# Patient Record
Sex: MALE | Race: OTHER | HISPANIC OR LATINO | URBAN - METROPOLITAN AREA
[De-identification: names, ages, dates, MRNs, and addresses within clinical notes are randomized per-mention and may not be internally consistent; named-entity substitution may affect disease eponyms.]

---

## 2023-03-08 ENCOUNTER — EMERGENCY (EMERGENCY)
Facility: HOSPITAL | Age: 38
LOS: 0 days | Discharge: HOME | End: 2023-03-08
Attending: STUDENT IN AN ORGANIZED HEALTH CARE EDUCATION/TRAINING PROGRAM
Payer: MEDICAID

## 2023-03-08 VITALS — DIASTOLIC BLOOD PRESSURE: 76 MMHG | HEART RATE: 82 BPM | SYSTOLIC BLOOD PRESSURE: 152 MMHG | OXYGEN SATURATION: 99 %

## 2023-03-08 VITALS
OXYGEN SATURATION: 99 % | HEART RATE: 93 BPM | HEIGHT: 61 IN | TEMPERATURE: 98 F | SYSTOLIC BLOOD PRESSURE: 195 MMHG | WEIGHT: 119.93 LBS | DIASTOLIC BLOOD PRESSURE: 95 MMHG | RESPIRATION RATE: 16 BRPM
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
## 2023-03-08 DIAGNOSIS — M79.645 PAIN IN LEFT FINGER(S): ICD-10-CM

## 2023-03-08 DIAGNOSIS — S69.82XA OTHER SPECIFIED INJURIES OF LEFT WRIST, HAND AND FINGER(S), INITIAL ENCOUNTER: ICD-10-CM
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
## 2023-03-08 PROCEDURE — 29125 APPL SHORT ARM SPLINT STATIC: CPT

## 2023-03-08 PROCEDURE — 29130 APPL FINGER SPLINT STATIC: CPT | Mod: FA

## 2023-03-08 PROCEDURE — 99284 EMERGENCY DEPT VISIT MOD MDM: CPT | Mod: 25

## 2023-03-08 PROCEDURE — 99283 EMERGENCY DEPT VISIT LOW MDM: CPT | Mod: 25

## 2023-03-08 PROCEDURE — 73130 X-RAY EXAM OF HAND: CPT | Mod: LT

## 2023-03-08 PROCEDURE — 73130 X-RAY EXAM OF HAND: CPT | Mod: 26,LT

## 2023-03-08 NOTE — ED PROVIDER NOTE - CLINICAL SUMMARY MEDICAL DECISION MAKING FREE TEXT BOX
Patient here for left thumb pain patient fell out of bed landed on his hand advised to come to the ED as fracture he may need further intervention.  Exam shows tenderness of the first digit.  Over the MCP with mild swelling no bruising no crepitus no nailbed injury no scaphoid tenderness limited range of motion due to pain.  Neurologically intact cardiovascular intact good cap refill.  X-rays reviewed patient will be splinted discharge

## 2023-03-08 NOTE — ED PROVIDER NOTE - PHYSICAL EXAMINATION
Physical Exam    Vital Signs: I have reviewed the initial vital signs.  Constitutional: well-nourished, appears stated age, no acute distress  Eyes: Conjunctiva pink, Sclera clear, PERRLA, EOMI.  Cardiovascular: S1 and S2, regular rate, regular rhythm, well-perfused extremities, radial pulses equal and 2+  Respiratory: unlabored respiratory effort, clear to auscultation bilaterally no wheezing, rales and rhonchi  Gastrointestinal: soft, non-tender abdomen, no pulsatile mass, normal bowl sounds  Musculoskeletal: supple neck, no lower extremity edema, no midline tenderness. TTP of the L 1st digit greates over the MCP, with mild swelling, no bruising, no crepitus. no nail bed injury. no scaphoid tenderness. Limited ROM 2/2 pain. sensory intact   Integumentary: warm, dry, no rash  Neurologic: awake, alert, cranial nerves II-XII grossly intact, extremities’ motor and sensory functions grossly intact  Psychiatric: appropriate mood, appropriate affect

## 2023-03-08 NOTE — ED PROVIDER NOTE - OBJECTIVE STATEMENT
Pt is a 38 male with PMH noted in chart presents to ED with complaints of L thumb pain for past few days. As per pt, fell out of bed, landing on his L hand/ thumb, went to , diagnosed with fracture, however advised to come into ED as fx may need further intervention? Pt denies any other injuries or complaints. pain is mild, non raditaing with no alleviating factors. no other complaints

## 2023-03-08 NOTE — ED PROVIDER NOTE - NSFOLLOWUPINSTRUCTIONS_ED_ALL_ED_FT
Follow up with your primary medical doctor in 1-2 days as well as with orthopedist    Many things can cause hand pain. Some common causes are:  An injury.  Repeating the same movement with your hand over and over (overuse).  Osteoporosis.  Arthritis.  Lumps in the tendons or joints of the hand and wrist (ganglion cysts).  Infection.  Follow these instructions at home:  Pay attention to any changes in your symptoms. Take these actions to help with your discomfort:  If directed, put ice on the affected area:  Put ice in a plastic bag.  Place a towel between your skin and the bag.  Leave the ice on for 15–20 minutes, 3?4 times a day for 2 days.  Take over-the-counter and prescription medicines only as told by your health care provider.  Minimize stress on your hands and wrists as much as possible.  Take breaks from repetitive activity often.  Do stretches as told by your health care provider.  Do not do activities that make your pain worse.  Contact a health care provider if:  Your pain does not get better after a few days of self-care.  Your pain gets worse.  Your pain affects your ability to do your daily activities.  Get help right away if:  Your hand becomes warm, red, or swollen.  Your hand is numb or tingling.  Your hand is extremely swollen or deformed.  Your hand or fingers turn white or blue.  You cannot move your hand, wrist, or fingers.  This information is not intended to replace advice given to you by your health care provider. Make sure you discuss any questions you have with your health care provider.

## 2023-03-08 NOTE — ED PROVIDER NOTE - CARE PROVIDER_API CALL
Venkatesh Cole)  Orthopaedic Surgery  [address] [place] [address]  Phone: [phone]  Fax: [phone]  Follow Up Time: 1-3 Days

## 2023-03-08 NOTE — ED PROVIDER NOTE - NS ED ATTENDING STATEMENT MOD
This was a shared visit with the MARIELOS. I reviewed and verified the documentation and independently performed the documented:

## 2023-03-08 NOTE — ED PROVIDER NOTE - PATIENT PORTAL LINK FT
You can access the FollowMyHealth Patient Portal offered by [facility] by registering at the following website: [email or web address]. By joining [facility]’s FollowMyHealth portal, you will also be able to view your health information using other applications (apps) compatible with our system.

## 2023-03-08 NOTE — ED ADULT TRIAGE NOTE - WEIGHT IN LBS
Skin Complaint HPI





- HPI Summary


HPI Summary: 





Patient patients with right great toe pain. He tells me that about 10 days ago 

he developed right great toe pain which progressed to swelling, redness, and 

mild purulent discharge. Today he is still having swelling, redness, and 

drainage from the lateral aspect of the toe. He does have an ingrown toenail on 

this same toe. He denies injury, numbness, tingling, fever, or chills.





- History of Current Complaint


Chief Complaint: UCLowerExtremity


Time Seen by Provider: 12/12/17 12:00


Stated Complaint: INGROWN TOENAIL


Hx Obtained From: Patient


Onset/Duration: Gradual Onset


Pain Intensity: 3


Pain Scale Used: 0-10 Numeric





- Allergy/Home Medications


Allergies/Adverse Reactions: 


 Allergies











Allergy/AdvReac Type Severity Reaction Status Date / Time


 


No Known Allergies Allergy   Verified 12/12/17 11:46














Review of Systems


Constitutional: Negative


Skin: Other - Pain, redness, swelling right great toe


Respiratory: Negative


Cardiovascular: Negative


Neurovascular: Negative


Musculoskeletal: Negative


Neurological: Negative


All Other Systems Reviewed And Are Negative: Yes





PMH/Surg Hx/FS Hx/Imm Hx


Previously Healthy: Yes





- Surgical History


Surgical History: Yes


Surgery Procedure, Year, and Place: right wrist





- Family History


Known Family History: Positive: Hypertension


   Negative: Cardiac Disease, Diabetes





- Social History


Occupation: Employed Full-time


Lives: With Family


Alcohol Use: None


Substance Use Type: None


Smoking Status (MU): Never Smoked Tobacco


Have You Smoked in the Last Year: No


Household Exposure Type: Cigarettes





- Immunization History


Most Recent Influenza Vaccination: NOT UTD


Most Recent Tetanus Shot: NOT UTD.





Physical Exam


Triage Information Reviewed: Yes


Appearance: Well-Appearing, Well-Nourished


Vital Signs: 


 Initial Vital Signs











Temp  97.7 F   12/12/17 11:39


 


Pulse  67   12/12/17 11:39


 


Resp  16   12/12/17 11:39


 


BP  122/71   12/12/17 11:39


 


Pulse Ox  100   12/12/17 11:39











Vital Signs Reviewed: Yes


Neck: Positive: Supple, Nontender, No Lymphadenopathy


Musculoskeletal: Positive: Strength Intact - Right great toe, ROM Intact - 

Right great toe, Edema @ - Right great toe - mild


Psychological: Positive: Age Appropriate Behavior


Skin: Positive: Other - Right great toe: there is mild erythema and edema on 

the medial aspect. There is mild purulent drainage able to be expressed, but 

with significant pain. No streaking or open wounds.





Course/Dx





- Course


Course Of Treatment: A time out was performed, witnessed, and signed. The area 

was cleansed with alcohol. 2mL of 2% lidocaine without epi was administered and 

good anesthetization was achieved. A #11 blade scalpel was used to make a <1mm 

incision at the base of the medial right great toe nail. The area was massaged 

and more purulent discharge was able to be expressed. Pt tolerated procedure 

well.  Will start him on Keflex for 7 days for paronychia and mild cellulitis





- Differential Diagnoses - Skin Complaint


Differential Diagnoses: Cellulitis





- Diagnoses


Provider Diagnoses: Paronychia right great toe





Discharge





- Discharge Plan


Condition: Stable


Disposition: HOME


Prescriptions: 


Cephalexin CAP* [Keflex CAP*] 500 mg PO BID #14 cap


Patient Education Materials:  Paronychia (ED)


Forms:  *Work Release


Referrals: 


Benny Kc MD [Primary Care Provider] - 


Additional Instructions: 


If you develop a fever, SOB, chest pain, new or worsening symptoms - please 

call your PCP or go to the ED. [phone]

## 2023-05-25 NOTE — ED ADULT TRIAGE NOTE - CHIEF COMPLAINT QUOTE
Assessment/Plan:     Chronic Problems:  Mild episode of recurrent major depressive disorder (HCC)  We will increase sertraline to 50 mg daily  Patient will return in 3 weeks  Advised to call with any issues with the medication  If no improvement in 3 weeks, we will switch medication  Gastroesophageal reflux disease without esophagitis  Much improved with omeprazole daily  Visit Diagnosis:  Diagnoses and all orders for this visit:    Mild episode of recurrent major depressive disorder ([facility] [address] )    Anxiety  -     sertraline (ZOLOFT) 50 mg tablet; Take 1 tablet (50 mg total) by mouth daily    Gastroesophageal reflux disease without esophagitis  -     omeprazole (PriLOSEC) 20 mg delayed release capsule; Take 1 capsule (20 mg total) by mouth daily          Subjective:    Patient ID: Greg Fernandez is a 24 y o  male  Patient presents for follow-up on anxiety and depression  Does not feel like the Zoloft is working yet  He feels his anxiety 7/10, depression is 6/10  He does feel that his omeprazole is working well  The following portions of the patient's history were reviewed and updated as appropriate: allergies, current medications, past family history, past medical history, past social history, past surgical history and problem list     Review of Systems   Constitutional: Negative for chills and fever  HENT: Negative for ear pain and sore throat  Eyes: Negative for pain and visual disturbance  Respiratory: Negative for cough and shortness of breath  Cardiovascular: Negative for chest pain and palpitations  Gastrointestinal: Negative for abdominal pain and vomiting  Genitourinary: Negative for dysuria and hematuria  Musculoskeletal: Negative for arthralgias and back pain  Skin: Negative for color change and rash  Neurological: Negative for dizziness, seizures, syncope, light-headedness and headaches  Psychiatric/Behavioral: Positive for dysphoric mood   Negative for "self-injury, sleep disturbance and suicidal ideas  The patient is nervous/anxious  All other systems reviewed and are negative  /80 (BP Location: Left arm, Patient Position: Sitting, Cuff Size: Standard)   Pulse 79   Temp (!) 96 1 °F (35 6 °C)   Ht 5' 6\" (1 676 m)   Wt 89 6 kg (197 lb 9 6 oz)   SpO2 97%   BMI 31 89 kg/m²   Social History     Socioeconomic History   • Marital status: Single     Spouse name: Not on file   • Number of children: Not on file   • Years of education: Not on file   • Highest education level: Not on file   Occupational History   • Not on file   Tobacco Use   • Smoking status: Never   • Smokeless tobacco: Never   Vaping Use   • Vaping Use: Never used   Substance and Sexual Activity   • Alcohol use: Yes     Comment: occasionally   • Drug use: Never   • Sexual activity: Yes   Other Topics Concern   • Not on file   Social History Narrative   • Not on file     Social Determinants of Health     Financial Resource Strain: Not on file   Food Insecurity: Not on file   Transportation Needs: Not on file   Physical Activity: Not on file   Stress: Not on file   Social Connections: Not on file   Intimate Partner Violence: Not on file   Housing Stability: Not on file     No past medical history on file  Family History   Problem Relation Age of Onset   • No Known Problems Mother    • No Known Problems Father      Past Surgical History:   Procedure Laterality Date   • FRACTURE SURGERY         Current Outpatient Medications:   •  omeprazole (PriLOSEC) 20 mg delayed release capsule, Take 1 capsule (20 mg total) by mouth daily, Disp: 90 capsule, Rfl: 1  •  sertraline (ZOLOFT) 50 mg tablet, Take 1 tablet (50 mg total) by mouth daily, Disp: 30 tablet, Rfl: 0    No Known Allergies       Lab Review   No visits with results within 2 Month(s) from this visit     Latest known visit with results is:   Appointment on 12/23/2021   Component Date Value   • Hep B S Ab 12/23/2021 126 61     " "    Imaging: No results found  Objective:     Physical Exam  Constitutional:       General: He is not in acute distress  Appearance: He is well-developed  Cardiovascular:      Rate and Rhythm: Normal rate and regular rhythm  Heart sounds: Normal heart sounds  No murmur heard  No gallop  Pulmonary:      Effort: Pulmonary effort is normal  No respiratory distress  Breath sounds: Normal breath sounds  No wheezing  Musculoskeletal:         General: Normal range of motion  Skin:     General: Skin is warm and dry  Neurological:      Mental Status: He is alert and oriented to person, place, and time  There are no Patient Instructions on file for this visit  NIKOS Perdue    Portions of the record may have been created with voice recognition software  Occasional wrong word or \"sound a like\" substitutions may have occurred due to the inherent limitations of voice recognition software  Read the chart carefully and recognize, using context, where substitutions have occurred    " Pt here with L thumb pain x few days after falling off bed.

## 2023-12-23 ENCOUNTER — EMERGENCY (EMERGENCY)
Facility: HOSPITAL | Age: 38
LOS: 0 days | Discharge: HOME | End: 2023-12-23
Attending: EMERGENCY MEDICINE
Payer: MEDICAID

## 2023-12-23 VITALS
HEART RATE: 89 BPM | SYSTOLIC BLOOD PRESSURE: 120 MMHG | OXYGEN SATURATION: 99 % | HEIGHT: 60 IN | TEMPERATURE: 98 F | RESPIRATION RATE: 18 BRPM | WEIGHT: 125 LBS | DIASTOLIC BLOOD PRESSURE: 71 MMHG

## 2023-12-23 DIAGNOSIS — M54.9 DORSALGIA, UNSPECIFIED: ICD-10-CM

## 2023-12-23 DIAGNOSIS — R07.81 PLEURODYNIA: ICD-10-CM

## 2023-12-23 DIAGNOSIS — R10.9 UNSPECIFIED ABDOMINAL PAIN: ICD-10-CM
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
## 2023-12-23 PROBLEM — Z78.9 OTHER SPECIFIED HEALTH STATUS: Chronic | Status: ACTIVE | Noted: 2023-03-08

## 2023-12-23 LAB
ALBUMIN SERPL ELPH-MCNC: 4.8 G/DL — SIGNIFICANT CHANGE UP (ref 3.5–5.2)
ALBUMIN SERPL ELPH-MCNC: 4.8 G/DL — SIGNIFICANT CHANGE UP (ref 3.5–5.2)
ALP SERPL-CCNC: 102 U/L — SIGNIFICANT CHANGE UP (ref 30–115)
ALP SERPL-CCNC: 102 U/L — SIGNIFICANT CHANGE UP (ref 30–115)
ALT FLD-CCNC: 147 U/L — HIGH (ref 0–41)
ALT FLD-CCNC: 147 U/L — HIGH (ref 0–41)
ANION GAP SERPL CALC-SCNC: 12 MMOL/L — SIGNIFICANT CHANGE UP (ref 7–14)
ANION GAP SERPL CALC-SCNC: 12 MMOL/L — SIGNIFICANT CHANGE UP (ref 7–14)
AST SERPL-CCNC: 190 U/L — HIGH (ref 0–41)
AST SERPL-CCNC: 190 U/L — HIGH (ref 0–41)
BASOPHILS # BLD AUTO: 0.05 K/UL — SIGNIFICANT CHANGE UP (ref 0–0.2)
BASOPHILS # BLD AUTO: 0.05 K/UL — SIGNIFICANT CHANGE UP (ref 0–0.2)
BASOPHILS NFR BLD AUTO: 1 % — SIGNIFICANT CHANGE UP (ref 0–1)
BASOPHILS NFR BLD AUTO: 1 % — SIGNIFICANT CHANGE UP (ref 0–1)
BILIRUB SERPL-MCNC: 0.4 MG/DL — SIGNIFICANT CHANGE UP (ref 0.2–1.2)
BILIRUB SERPL-MCNC: 0.4 MG/DL — SIGNIFICANT CHANGE UP (ref 0.2–1.2)
BUN SERPL-MCNC: 4 MG/DL — LOW (ref 10–20)
BUN SERPL-MCNC: 4 MG/DL — LOW (ref 10–20)
CALCIUM SERPL-MCNC: 9.1 MG/DL — SIGNIFICANT CHANGE UP (ref 8.4–10.4)
CALCIUM SERPL-MCNC: 9.1 MG/DL — SIGNIFICANT CHANGE UP (ref 8.4–10.4)
CHLORIDE SERPL-SCNC: 106 MMOL/L — SIGNIFICANT CHANGE UP (ref 98–110)
CHLORIDE SERPL-SCNC: 106 MMOL/L — SIGNIFICANT CHANGE UP (ref 98–110)
CO2 SERPL-SCNC: 25 MMOL/L — SIGNIFICANT CHANGE UP (ref 17–32)
CO2 SERPL-SCNC: 25 MMOL/L — SIGNIFICANT CHANGE UP (ref 17–32)
CREAT SERPL-MCNC: 0.8 MG/DL — SIGNIFICANT CHANGE UP (ref 0.7–1.5)
CREAT SERPL-MCNC: 0.8 MG/DL — SIGNIFICANT CHANGE UP (ref 0.7–1.5)
EGFR: 118 ML/MIN/1.73M2 — SIGNIFICANT CHANGE UP
EGFR: 118 ML/MIN/1.73M2 — SIGNIFICANT CHANGE UP
EOSINOPHIL # BLD AUTO: 0.1 K/UL — SIGNIFICANT CHANGE UP (ref 0–0.7)
EOSINOPHIL # BLD AUTO: 0.1 K/UL — SIGNIFICANT CHANGE UP (ref 0–0.7)
EOSINOPHIL NFR BLD AUTO: 2 % — SIGNIFICANT CHANGE UP (ref 0–8)
EOSINOPHIL NFR BLD AUTO: 2 % — SIGNIFICANT CHANGE UP (ref 0–8)
GLUCOSE SERPL-MCNC: 95 MG/DL — SIGNIFICANT CHANGE UP (ref 70–99)
GLUCOSE SERPL-MCNC: 95 MG/DL — SIGNIFICANT CHANGE UP (ref 70–99)
HCT VFR BLD CALC: 43.4 % — SIGNIFICANT CHANGE UP (ref 42–52)
HCT VFR BLD CALC: 43.4 % — SIGNIFICANT CHANGE UP (ref 42–52)
HGB BLD-MCNC: 15.1 G/DL — SIGNIFICANT CHANGE UP (ref 14–18)
HGB BLD-MCNC: 15.1 G/DL — SIGNIFICANT CHANGE UP (ref 14–18)
IMM GRANULOCYTES NFR BLD AUTO: 0.2 % — SIGNIFICANT CHANGE UP (ref 0.1–0.3)
IMM GRANULOCYTES NFR BLD AUTO: 0.2 % — SIGNIFICANT CHANGE UP (ref 0.1–0.3)
LYMPHOCYTES # BLD AUTO: 2.39 K/UL — SIGNIFICANT CHANGE UP (ref 1.2–3.4)
LYMPHOCYTES # BLD AUTO: 2.39 K/UL — SIGNIFICANT CHANGE UP (ref 1.2–3.4)
LYMPHOCYTES # BLD AUTO: 48.1 % — SIGNIFICANT CHANGE UP (ref 20.5–51.1)
LYMPHOCYTES # BLD AUTO: 48.1 % — SIGNIFICANT CHANGE UP (ref 20.5–51.1)
MCHC RBC-ENTMCNC: 32.1 PG — HIGH (ref 27–31)
MCHC RBC-ENTMCNC: 32.1 PG — HIGH (ref 27–31)
MCHC RBC-ENTMCNC: 34.8 G/DL — SIGNIFICANT CHANGE UP (ref 32–37)
MCHC RBC-ENTMCNC: 34.8 G/DL — SIGNIFICANT CHANGE UP (ref 32–37)
MCV RBC AUTO: 92.3 FL — SIGNIFICANT CHANGE UP (ref 80–94)
MCV RBC AUTO: 92.3 FL — SIGNIFICANT CHANGE UP (ref 80–94)
MONOCYTES # BLD AUTO: 0.6 K/UL — SIGNIFICANT CHANGE UP (ref 0.1–0.6)
MONOCYTES # BLD AUTO: 0.6 K/UL — SIGNIFICANT CHANGE UP (ref 0.1–0.6)
MONOCYTES NFR BLD AUTO: 12.1 % — HIGH (ref 1.7–9.3)
MONOCYTES NFR BLD AUTO: 12.1 % — HIGH (ref 1.7–9.3)
NEUTROPHILS # BLD AUTO: 1.82 K/UL — SIGNIFICANT CHANGE UP (ref 1.4–6.5)
NEUTROPHILS # BLD AUTO: 1.82 K/UL — SIGNIFICANT CHANGE UP (ref 1.4–6.5)
NEUTROPHILS NFR BLD AUTO: 36.6 % — LOW (ref 42.2–75.2)
NEUTROPHILS NFR BLD AUTO: 36.6 % — LOW (ref 42.2–75.2)
NRBC # BLD: 0 /100 WBCS — SIGNIFICANT CHANGE UP (ref 0–0)
NRBC # BLD: 0 /100 WBCS — SIGNIFICANT CHANGE UP (ref 0–0)
PLATELET # BLD AUTO: 236 K/UL — SIGNIFICANT CHANGE UP (ref 130–400)
PLATELET # BLD AUTO: 236 K/UL — SIGNIFICANT CHANGE UP (ref 130–400)
PMV BLD: 10.3 FL — SIGNIFICANT CHANGE UP (ref 7.4–10.4)
PMV BLD: 10.3 FL — SIGNIFICANT CHANGE UP (ref 7.4–10.4)
POTASSIUM SERPL-MCNC: 4.4 MMOL/L — SIGNIFICANT CHANGE UP (ref 3.5–5)
POTASSIUM SERPL-MCNC: 4.4 MMOL/L — SIGNIFICANT CHANGE UP (ref 3.5–5)
POTASSIUM SERPL-SCNC: 4.4 MMOL/L — SIGNIFICANT CHANGE UP (ref 3.5–5)
POTASSIUM SERPL-SCNC: 4.4 MMOL/L — SIGNIFICANT CHANGE UP (ref 3.5–5)
PROT SERPL-MCNC: 8.1 G/DL — HIGH (ref 6–8)
PROT SERPL-MCNC: 8.1 G/DL — HIGH (ref 6–8)
RBC # BLD: 4.7 M/UL — SIGNIFICANT CHANGE UP (ref 4.7–6.1)
RBC # BLD: 4.7 M/UL — SIGNIFICANT CHANGE UP (ref 4.7–6.1)
RBC # FLD: 11.9 % — SIGNIFICANT CHANGE UP (ref 11.5–14.5)
RBC # FLD: 11.9 % — SIGNIFICANT CHANGE UP (ref 11.5–14.5)
SODIUM SERPL-SCNC: 143 MMOL/L — SIGNIFICANT CHANGE UP (ref 135–146)
SODIUM SERPL-SCNC: 143 MMOL/L — SIGNIFICANT CHANGE UP (ref 135–146)
WBC # BLD: 4.97 K/UL — SIGNIFICANT CHANGE UP (ref 4.8–10.8)
WBC # BLD: 4.97 K/UL — SIGNIFICANT CHANGE UP (ref 4.8–10.8)
WBC # FLD AUTO: 4.97 K/UL — SIGNIFICANT CHANGE UP (ref 4.8–10.8)
WBC # FLD AUTO: 4.97 K/UL — SIGNIFICANT CHANGE UP (ref 4.8–10.8)

## 2023-12-23 PROCEDURE — 85025 COMPLETE CBC W/AUTO DIFF WBC: CPT

## 2023-12-23 PROCEDURE — 74177 CT ABD & PELVIS W/CONTRAST: CPT | Mod: MA

## 2023-12-23 PROCEDURE — 71045 X-RAY EXAM CHEST 1 VIEW: CPT

## 2023-12-23 PROCEDURE — 99285 EMERGENCY DEPT VISIT HI MDM: CPT

## 2023-12-23 PROCEDURE — 99284 EMERGENCY DEPT VISIT MOD MDM: CPT | Mod: 25

## 2023-12-23 PROCEDURE — 80053 COMPREHEN METABOLIC PANEL: CPT

## 2023-12-23 PROCEDURE — 72125 CT NECK SPINE W/O DYE: CPT | Mod: MA

## 2023-12-23 PROCEDURE — 72170 X-RAY EXAM OF PELVIS: CPT

## 2023-12-23 PROCEDURE — 36415 COLL VENOUS BLD VENIPUNCTURE: CPT

## 2023-12-23 PROCEDURE — 71045 X-RAY EXAM CHEST 1 VIEW: CPT | Mod: 26

## 2023-12-23 PROCEDURE — 70450 CT HEAD/BRAIN W/O DYE: CPT | Mod: MA

## 2023-12-23 PROCEDURE — 72170 X-RAY EXAM OF PELVIS: CPT | Mod: 26

## 2023-12-23 PROCEDURE — 71260 CT THORAX DX C+: CPT | Mod: 26,MA

## 2023-12-23 PROCEDURE — 72125 CT NECK SPINE W/O DYE: CPT | Mod: 26,MA

## 2023-12-23 PROCEDURE — 74177 CT ABD & PELVIS W/CONTRAST: CPT | Mod: 26,MA

## 2023-12-23 PROCEDURE — 71260 CT THORAX DX C+: CPT | Mod: MA

## 2023-12-23 PROCEDURE — 70450 CT HEAD/BRAIN W/O DYE: CPT | Mod: 26,MA

## 2023-12-23 RX ORDER — ACETAMINOPHEN 500 MG
650 TABLET ORAL ONCE
Refills: 0 | Status: COMPLETED | OUTPATIENT
Start: 2023-12-23 | End: 2023-12-23

## 2023-12-23 RX ADMIN — Medication 650 MILLIGRAM(S): at 14:37

## 2023-12-23 NOTE — ED PROVIDER NOTE - PHYSICAL EXAMINATION
Physical Exam    Vital Signs: I have reviewed the initial vital signs.  Constitutional: appears stated age, no acute distress  Eyes: Conjunctiva pink, Sclera clear,   Cardiovascular: S1 and S2, regular rate, regular rhythm, well-perfused extremities, radial pulses equal and 2+, pedal pulses 2+ and equal  Respiratory: unlabored respiratory effort, clear to auscultation bilaterally no wheezing, rales and rhonchi  Gastrointestinal: soft, non-tender abdomen, no pulsatile mass, normal bowl sounds  Musculoskeletal: supple neck, no lower extremity edema, no midline tenderness, ttp right ribs and flank area.   Integumentary: warm, dry, no rash  Neurologic: awake, alert, nvi

## 2023-12-23 NOTE — ED PROVIDER NOTE - CLINICAL SUMMARY MEDICAL DECISION MAKING FREE TEXT BOX
Patient presents after an 8 foot fall off of a ladder 3 days ago. Now with worse right rib and back pain. normal gait. labs, imaging done. no traumatic injuries. Results discussed. Made aware of LFTs. Discharged with pmd follow up and return precautions.

## 2023-12-23 NOTE — ED ADULT NURSE NOTE - OBJECTIVE STATEMENT
Patient presented to ED c/o pain to the right side of body after falling 8ft off of a ladder 3 days ago. Denies HT/LOC/Blood thinners

## 2023-12-23 NOTE — ED ADULT NURSE NOTE - NSFALLRISKINTERV_ED_ALL_ED
Communicate fall risk and risk factors to all staff, patient, and family/Provide visual cue: yellow wristband, yellow gown, etc/Reinforce activity limits and safety measures with patient and family/Call bell, personal items and telephone in reach/Instruct patient to call for assistance before getting out of bed/chair/stretcher/Non-slip footwear applied when patient is off stretcher/[place] to call system/Physically safe environment - no spills, clutter or unnecessary equipment/Purposeful Proactive Rounding/Room/bathroom lighting operational, light cord in reach Communicate fall risk and risk factors to all staff, patient, and family/Provide visual cue: yellow wristband, yellow gown, etc/Reinforce activity limits and safety measures with patient and family/Call bell, personal items and telephone in reach/Instruct patient to call for assistance before getting out of bed/chair/stretcher/Non-slip footwear applied when patient is off stretcher/[place] to call system/Physically safe environment - no spills, clutter or unnecessary equipment/Purposeful Proactive Rounding/Room/bathroom lighting operational, light cord in reach

## 2023-12-23 NOTE — ED PROVIDER NOTE - ATTENDING APP SHARED VISIT CONTRIBUTION OF CARE
38-year-old male no past medical history presents after fall.  Patient states that 3 days ago he was working on a ladder.  States that he lost his footing and fell.  Fall was approximately 8 foot and landed onto his right side.  No head trauma or LOC.  Patient was doing okay but now states that he is having worsening right-sided back and rib pain.  No nausea vomiting.  No numbness tingling or weakness.  No abdominal pain.  Walking normally.    CONSTITUTIONAL: Well-developed; well-nourished; in no acute distress.   SKIN: warm, dry  HEAD: Normocephalic; atraumatic.  EYES: PERRL, EOMI, no conjunctival erythema  ENT: No nasal discharge; airway clear.  NECK: Supple; non tender.  CARD: S1, S2 normal;  Regular rate and rhythm.   RESP: No wheezes, rales or rhonchi.  ABD: soft non tender, non distended, no rebound or guarding  EXT: Normal ROM.  5/5 strength in all 4 extremities. normal gait. no midline spinal tenderness. + right paraspinal tenderness. + right chest wall tenderness.   LYMPH: No acute cervical adenopathy.  NEURO: Alert, oriented, grossly unremarkable. neurovascularly intact  PSYCH: Cooperative, appropriate.

## 2023-12-23 NOTE — ED ADULT NURSE NOTE - NS ED NURSE LEVEL OF CONSCIOUSNESS MENTAL STATUS
Cardiomyopathy due to chemotherapy    Coronary artery disease    Diabetes    Dyslipidemia    HTN (hypertension)    Lymphoma
Awake/Alert

## 2023-12-23 NOTE — ED PROVIDER NOTE - OBJECTIVE STATEMENT
37 yo male, presents to er for fall, several days, ago fell off 8 ft ladder, c/o right rib and flank pain, mild, aching, no radiation. no chi, neck pain, cp, sob, abd pain, nv, loc. 39 yo male, presents to er for fall, several days, ago fell off 8 ft ladder, c/o right rib and flank pain, mild, aching, no radiation. no chi, neck pain, cp, sob, abd pain, nv, loc.

## 2023-12-23 NOTE — ED PROVIDER NOTE - PATIENT PORTAL LINK FT
You can access the FollowMyHealth Patient Portal offered by [facility] by registering at the following website: [email or web address]. By joining [employer]’s FollowMyHealth portal, you will also be able to view your health information using other applications (apps) compatible with our system. You can access the FollowMyHealth Patient Portal offered by [facility] by registering at the following website: [email or web address]. By joining [email or web address]’s FollowMyHealth portal, you will also be able to view your health information using other applications (apps) compatible with our system.

## 2024-09-02 ENCOUNTER — EMERGENCY (EMERGENCY)
Facility: HOSPITAL | Age: 39
LOS: 0 days | Discharge: HOME | End: 2024-09-02
Attending: STUDENT IN AN ORGANIZED HEALTH CARE EDUCATION/TRAINING PROGRAM
Payer: MEDICAID

## 2024-09-02 VITALS
RESPIRATION RATE: 18 BRPM | OXYGEN SATURATION: 97 % | DIASTOLIC BLOOD PRESSURE: 80 MMHG | WEIGHT: 119.93 LBS | HEART RATE: 88 BPM | SYSTOLIC BLOOD PRESSURE: 121 MMHG | TEMPERATURE: 98 F

## 2024-09-02 DIAGNOSIS — R10.13 EPIGASTRIC PAIN: ICD-10-CM

## 2024-09-02 DIAGNOSIS — Z90.49 ACQUIRED ABSENCE OF OTHER SPECIFIED PARTS OF DIGESTIVE TRACT: Chronic | ICD-10-CM

## 2024-09-02 DIAGNOSIS — R74.01 ELEVATION OF LEVELS OF LIVER TRANSAMINASE LEVELS: ICD-10-CM

## 2024-09-02 DIAGNOSIS — R07.89 OTHER CHEST PAIN: ICD-10-CM

## 2024-09-02 LAB
ALBUMIN SERPL ELPH-MCNC: 4.9 G/DL — SIGNIFICANT CHANGE UP (ref 3.5–5.2)
ALP SERPL-CCNC: 111 U/L — SIGNIFICANT CHANGE UP (ref 30–115)
ALT FLD-CCNC: 197 U/L — HIGH (ref 0–41)
ANION GAP SERPL CALC-SCNC: 14 MMOL/L — SIGNIFICANT CHANGE UP (ref 7–14)
AST SERPL-CCNC: 236 U/L — HIGH (ref 0–41)
BASOPHILS # BLD AUTO: 0.04 K/UL — SIGNIFICANT CHANGE UP (ref 0–0.2)
BASOPHILS NFR BLD AUTO: 0.6 % — SIGNIFICANT CHANGE UP (ref 0–1)
BILIRUB SERPL-MCNC: 0.3 MG/DL — SIGNIFICANT CHANGE UP (ref 0.2–1.2)
BUN SERPL-MCNC: 7 MG/DL — LOW (ref 10–20)
CALCIUM SERPL-MCNC: 9.1 MG/DL — SIGNIFICANT CHANGE UP (ref 8.4–10.5)
CHLORIDE SERPL-SCNC: 103 MMOL/L — SIGNIFICANT CHANGE UP (ref 98–110)
CO2 SERPL-SCNC: 24 MMOL/L — SIGNIFICANT CHANGE UP (ref 17–32)
CREAT SERPL-MCNC: 0.8 MG/DL — SIGNIFICANT CHANGE UP (ref 0.7–1.5)
EGFR: 117 ML/MIN/1.73M2 — SIGNIFICANT CHANGE UP
EOSINOPHIL # BLD AUTO: 0.17 K/UL — SIGNIFICANT CHANGE UP (ref 0–0.7)
EOSINOPHIL NFR BLD AUTO: 2.6 % — SIGNIFICANT CHANGE UP (ref 0–8)
GLUCOSE SERPL-MCNC: 101 MG/DL — HIGH (ref 70–99)
HCT VFR BLD CALC: 45.6 % — SIGNIFICANT CHANGE UP (ref 42–52)
HGB BLD-MCNC: 15.9 G/DL — SIGNIFICANT CHANGE UP (ref 14–18)
IMM GRANULOCYTES NFR BLD AUTO: 0.5 % — HIGH (ref 0.1–0.3)
LIDOCAIN IGE QN: 42 U/L — SIGNIFICANT CHANGE UP (ref 7–60)
LYMPHOCYTES # BLD AUTO: 2.57 K/UL — SIGNIFICANT CHANGE UP (ref 1.2–3.4)
LYMPHOCYTES # BLD AUTO: 38.6 % — SIGNIFICANT CHANGE UP (ref 20.5–51.1)
MAGNESIUM SERPL-MCNC: 2.5 MG/DL — HIGH (ref 1.8–2.4)
MCHC RBC-ENTMCNC: 32.5 PG — HIGH (ref 27–31)
MCHC RBC-ENTMCNC: 34.9 G/DL — SIGNIFICANT CHANGE UP (ref 32–37)
MCV RBC AUTO: 93.3 FL — SIGNIFICANT CHANGE UP (ref 80–94)
MONOCYTES # BLD AUTO: 0.72 K/UL — HIGH (ref 0.1–0.6)
MONOCYTES NFR BLD AUTO: 10.8 % — HIGH (ref 1.7–9.3)
NEUTROPHILS # BLD AUTO: 3.12 K/UL — SIGNIFICANT CHANGE UP (ref 1.4–6.5)
NEUTROPHILS NFR BLD AUTO: 46.9 % — SIGNIFICANT CHANGE UP (ref 42.2–75.2)
NRBC # BLD: 0 /100 WBCS — SIGNIFICANT CHANGE UP (ref 0–0)
NT-PROBNP SERPL-SCNC: <36 PG/ML — SIGNIFICANT CHANGE UP (ref 0–300)
PLATELET # BLD AUTO: 245 K/UL — SIGNIFICANT CHANGE UP (ref 130–400)
PMV BLD: 10.3 FL — SIGNIFICANT CHANGE UP (ref 7.4–10.4)
POTASSIUM SERPL-MCNC: 4.3 MMOL/L — SIGNIFICANT CHANGE UP (ref 3.5–5)
POTASSIUM SERPL-SCNC: 4.3 MMOL/L — SIGNIFICANT CHANGE UP (ref 3.5–5)
PROT SERPL-MCNC: 8 G/DL — SIGNIFICANT CHANGE UP (ref 6–8)
RBC # BLD: 4.89 M/UL — SIGNIFICANT CHANGE UP (ref 4.7–6.1)
RBC # FLD: 12.4 % — SIGNIFICANT CHANGE UP (ref 11.5–14.5)
SODIUM SERPL-SCNC: 141 MMOL/L — SIGNIFICANT CHANGE UP (ref 135–146)
TROPONIN T, HIGH SENSITIVITY RESULT: <6 NG/L — SIGNIFICANT CHANGE UP (ref 6–21)
WBC # BLD: 6.65 K/UL — SIGNIFICANT CHANGE UP (ref 4.8–10.8)
WBC # FLD AUTO: 6.65 K/UL — SIGNIFICANT CHANGE UP (ref 4.8–10.8)

## 2024-09-02 PROCEDURE — 71045 X-RAY EXAM CHEST 1 VIEW: CPT | Mod: 26

## 2024-09-02 PROCEDURE — 36415 COLL VENOUS BLD VENIPUNCTURE: CPT

## 2024-09-02 PROCEDURE — 76705 ECHO EXAM OF ABDOMEN: CPT

## 2024-09-02 PROCEDURE — 85025 COMPLETE CBC W/AUTO DIFF WBC: CPT

## 2024-09-02 PROCEDURE — 80053 COMPREHEN METABOLIC PANEL: CPT

## 2024-09-02 PROCEDURE — 84484 ASSAY OF TROPONIN QUANT: CPT

## 2024-09-02 PROCEDURE — 76705 ECHO EXAM OF ABDOMEN: CPT | Mod: 26

## 2024-09-02 PROCEDURE — 83690 ASSAY OF LIPASE: CPT

## 2024-09-02 PROCEDURE — 71045 X-RAY EXAM CHEST 1 VIEW: CPT

## 2024-09-02 PROCEDURE — 99285 EMERGENCY DEPT VISIT HI MDM: CPT

## 2024-09-02 PROCEDURE — 93005 ELECTROCARDIOGRAM TRACING: CPT

## 2024-09-02 PROCEDURE — 96374 THER/PROPH/DIAG INJ IV PUSH: CPT

## 2024-09-02 PROCEDURE — 93010 ELECTROCARDIOGRAM REPORT: CPT | Mod: 76

## 2024-09-02 PROCEDURE — 99285 EMERGENCY DEPT VISIT HI MDM: CPT | Mod: 25

## 2024-09-02 PROCEDURE — 83735 ASSAY OF MAGNESIUM: CPT

## 2024-09-02 PROCEDURE — 83880 ASSAY OF NATRIURETIC PEPTIDE: CPT

## 2024-09-02 RX ORDER — MAGNESIUM, ALUMINUM HYDROXIDE 200-225/5
30 SUSPENSION, ORAL (FINAL DOSE FORM) ORAL ONCE
Refills: 0 | Status: COMPLETED | OUTPATIENT
Start: 2024-09-02 | End: 2024-09-02

## 2024-09-02 RX ORDER — FAMOTIDINE 10 MG/ML
20 INJECTION INTRAVENOUS ONCE
Refills: 0 | Status: COMPLETED | OUTPATIENT
Start: 2024-09-02 | End: 2024-09-02

## 2024-09-02 RX ADMIN — Medication 30 MILLILITER(S): at 19:47

## 2024-09-02 RX ADMIN — FAMOTIDINE 20 MILLIGRAM(S): 10 INJECTION INTRAVENOUS at 19:47

## 2024-09-02 NOTE — ED PROVIDER NOTE - CARE PROVIDER_API CALL
Elier Guzman  Gastroenterology  [address] [place] [address]  Phone: [phone]  Fax: [phone]  Follow Up Time: 7-10 Days

## 2024-09-02 NOTE — ED PROVIDER NOTE - OBJECTIVE STATEMENT
39-year-old male with no significant past medical history presents to the ED for evaluation of midsternal chest pressure x 5 days.  Patient also reports she has been having epigastric pain and feels bloated.  Patient reports the pain is worse at night worse with eating.  Patient did eat out [employer] today.  Patient denies fever, chills, shortness of breath, back pain, neck pain, arm pain, jaw pain, recent trauma, nausea, vomiting, diarrhea, constipation, history of abdominal surgeries, family history of CAD, illicit drug use, cigarette smoking, or excessive use of alcohol.

## 2024-09-02 NOTE — ED PROVIDER NOTE - DIFFERENTIAL DIAGNOSIS
Differential Diagnosis pancreatitis, cholecystitis, choledocho, ACS, ptx, pneumomediastinum, pneumonia, pericarditis, myocarditis, pleural effusion. GI cocktail and reassess.

## 2024-09-02 NOTE — ED PROVIDER NOTE - ATTENDING APP SHARED VISIT CONTRIBUTION OF CARE
38 yo M with no PMHx who presents with nonradiating epigastric/midsternal cp x5 days. Sx worse at night and with eating. Nonexertional, nonpleuritic. No fever, sob, cough, nausea, vomiting, diarrhea, blood in urine/stool, leg swelling/pain, hx of clots, hormone use, recent immobilization/surg, malignancy, hemoptysis. Never seen cardiology or GI before. No FHx of CAD. Hx of appendectomy, no other abd surg.     No PMD    CONSTITUTIONAL: well developed, nontoxic appearing, in no acute distress, speaking in full sentences  SKIN: warm, dry, no rash, cap refill < 2 seconds  HEENT: normocephalic, atraumatic, no conjunctival erythema, moist mucous membranes, patent airway  NECK: supple  CV:  regular rate, regular rhythm, 2+ radial pulses bilaterally  RESP: no wheezes, no rales, no rhonchi, normal work of breathing  ABD: soft, mild epigastric tenderness, nondistended, no rebound, no guarding  MSK: normal ROM, no cyanosis, no edema, no calf tenderness  NEURO: alert, oriented, grossly unremarkable  PSYCH: cooperative, appropriate    A&P:  Pt here with postprandial/nocturnal epigastric/cp suggestive of PUD vs gastritis. Vitals wnl in ED. Low suspicion for PE given no PE risk factors, no tachycardia, no tachypnea, no hypoxemia, PERC negative. Low suspicion for dissection given equal distal pulses, normotensive, no neuro deficits however will check for widened mediastinum on cxr. Low suspicion for esophageal perforation given no recent instrumentation or vomiting, no increased thoracic pressure, no hammans crunch. Plan for labs, ekg, imaging r/o pancreatitis, cholecystitis, choledocho, ACS, ptx, pneumomediastinum, pneumonia, pericarditis, myocarditis, pleural effusion. GI cocktail and reassess.

## 2024-09-02 NOTE — ED PROVIDER NOTE - PHYSICAL EXAMINATION
Physical Exam    Vital Signs: I have reviewed the initial vital signs.  Constitutional: well-nourished, appears stated age, no acute distress  Eyes: Conjunctiva pink, Sclera clear  Cardiovascular: regular rate, regular rhythm, well-perfused extremities, radial pulses equal and 2+ b/l.   Respiratory: unlabored respiratory effort, clear to auscultation bilaterally no wheezing, rales and rhonchi. pt is speaking full sentences. no accessory muscle use. (+) reproducible midsternal chest tenderness. no flail chest or chest wall crepitus.  Gastrointestinal: soft, non-tender, nondistended abdomen, no pulsatile mass, no rebound, no guarding, negative murphys.   Musculoskeletal: FROM of b/l upper and lower extremities  Integumentary: warm, dry, no rash  Neurologic: awake, alert,  steady gait.   Psychiatric: appropriate mood, appropriate affect

## 2024-09-02 NOTE — ED PROVIDER NOTE - NSFOLLOWUPINSTRUCTIONS_ED_ALL_ED_FT
Gastritis en adultos    La gastritis es la inflamación del estómago. Hay dos tipos de gastritis:  - Gastritis aguda. Christina tipo aparece de manera repentina.  - Gastritis crónica. Christina tipo es mucho más frecuente. Se desarrolla lentamente y dura un mariano tiempo.    La gastritis se manifiesta cuando el revestimiento del estómago se debilita o se lesiona. Sin tratamiento, la gastritis puede causar sangrado y úlceras estomacales.    ¿Cuáles son las causas?  Esta afección puede ser causada por lo siguiente:  - Yuridia infección.  - Beber alcohol en exceso.  - Ciertos medicamentos. Estos incluyen corticoesteroides, antibióticos y algunos medicamentos de venta sin receta, case aspirina o ibuprofeno.  - Tener demasiado ácido en el estómago.  - Tener yuridia enfermedad del estómago.  Algunas otras causas son las siguientes:  - Yuridia reacción alérgica.  - Algunos tratamientos para el cáncer (radiación).  - Fumar cigarrillos o usar productos que contengan nicotina o tabaco.  - En algunos casos, se desconoce la causa de esta afección.    ¿Qué incrementa el riesgo?  Tener yuridia enfermedad de los intestinos.  Tener yuridia enfermedad por la cual el propio sistema inmunitario ataca el organismo (enfermedad autoinmunitaria), case la enfermedad de Crohn.  Usar aspirina o ibuprofeno y otros antiinflamatorios no esteroideos (BELTRAN) para tratar otras afecciones, case enfermedades cardíacas o dolor crónico.  Estrés.    ¿Cuáles son los signos o síntomas?  Los síntomas de esta afección incluyen los siguientes:  - Dolor o sensación de ardor en la parte superior del abdomen.  - Náuseas.  - Vómitos.  - Sensación molesta de saciedad después de comer.  - Pérdida de peso.  - Mal aliento.  - Rose en el vómito o en la materia fecal (heces).  En algunos casos, no hay síntomas.    ¿Cómo se diagnostica?  Esta afección se puede diagnosticar en función de nilda antecedentes médicos, un examen físico y pruebas. Las pruebas pueden incluir las siguientes:  - Nilda antecedentes médicos y yuridia descripción de nilda síntomas.  - Un examen físico.  - Pruebas. Estas pueden incluir las siguientes:  - Pruebas de rose.  - Pruebas de heces.  Yuridia prueba en la que se introduce un instrumento zapata y flexible con yuridia hilaria y yuridia pequeña cámara a través del esófago hasta el estómago (endoscopía johnny).  Yuridia prueba en la que se radha yuridia muestra de tejido para analizarla con un microscopio (biopsia).    ¿Cómo se trata?  Esta afección se puede tratar con medicamentos. Los medicamentos que se utilizan varían según la causa de la gastritis.  Si la afección se debe a yuridia infección bacteriana, pueden recetarle medicamentos antibióticos.  Si la afección se debe al exceso de ácido estomacal, se pueden administrar medicamentos denominados bloqueadores H2, inhibidores de la bomba de protones o antiácidos.  El tratamiento también puede incluir interrumpir el uso de ciertos medicamentos case aspirina o ibuprofeno y otros antiinflamatorios no esteroideos (BELTRAN).    Siga estas instrucciones en lackey casa:    Medicamentos    Use los medicamentos de venta haleigh y los recetados solamente case se lo haya indicado el médico.  Si le recetaron un antibiótico, tómelo case se lo haya indicado el médico. No deje de claudio el antibiótico aunque comience a sentirse mejor.    Consumo de alcohol    No tamika alcohol si:  - Lackey médico le indica no hacerlo.  - Está embarazada, puede estar embarazada o está tratando de quedar embarazada.    Si andrea alcohol:  - Limite lackey uso a las siguientes medidas:  - De 0 a 1 medida por día para las mujeres.  - De 0 a 2 medidas por día para los hombres.  Sepa cuánta cantidad de alcohol hay en las bebidas que radha. [place], yuridia medida equivale a yuridia botella de cerveza de 12 oz (355 ml), un vaso de vino de 5 oz (148 ml) o un vaso de yuridia bebida alcohólica de johnny graduación de 1½ oz (44 ml).    Instrucciones generales    Luz comidas pequeñas y frecuentes merary el día en lugar de comidas abundantes.  Evite los alimentos y las bebidas que intensifican los síntomas.  Hable con el médico sobre las formas de controlar el estrés, case realizar ejercicio con regularidad o practicar respiración profunda, meditación o yoga.  No consuma ningún producto que contenga nicotina o tabaco. Estos productos incluyen cigarrillos, tabaco para mascar y aparatos de vapeo, case los cigarrillos electrónicos. Si necesita ayuda para dejar de fumar, consulte al médico.  Tamika suficiente líquido case para mantener la orina de color amarillo pálido.  Concurra a todas las visitas de seguimiento. [place] es importante.    Comuníquese con un médico si:  - Nilda síntomas empeoran.  - El dolor abdominal empeora.  - Los síntomas regresan después del tratamiento.  - Tiene fiebre.    Solicite ayuda de inmediato si:  - Vomita rose o yuridia sustancia parecida a los posos del café.  - Las heces son negras o de color garvin oscuro.  - No puede retener los líquidos.    Estos síntomas pueden representar un problema grave que constituye yuridia emergencia. No espere a mitra si los síntomas desaparecen. Solicite atención médica de inmediato. Comuníquese con el servicio de emergencias de lackey localidad (911 en los [place]). No conduzca por nilda propios medios hasta el hospital.    Resumen    La gastritis es la inflamación del revestimiento del estómago que puede ocurrir de manera repentina (aguda) o desarrollarse lentamente con el tiempo (crónica).  Esta afección se diagnostica mediante los antecedentes médicos, un examen físico o pruebas.  Esta afección puede tratarse con medicamentos para tratar la infección o medicamentos para reducir la cantidad de ácido en el estómago.  Siga las instrucciones del médico acerca de claudio medicamentos, hacer cambios en la dieta y saber cuándo pedir ayuda.    Esta información no tiene case fin reemplazar el consejo del médico. Asegúrese de hacerle al médico cualquier pregunta que tenga.    Dolor de pecho    LO QUE NECESITA SABER:    ¿Qué provoca el dolor en el pecho?El dolor en el pecho puede ser provocado por yuridia variedad de condiciones, algunas no tan serias y otras que son de peligro mortal. El dolor de pecho también puede ser un síntoma de un problema digestivo, case la acidez o yuridia úlcera estomacal. Un ataque de ansiedad o yuridia emoción jez, case el enojo, también pueden provocar dolor de pecho. Yuridia infección, inflamación o fractura en un hueso o cartílago en el pecho podría provocar dolor o molestia. En ocasiones el dolor torácico o la presión en el pecho pueden ser el resultado de radha circulación de la rose al corazón (angina). El dolor de pecho también puede ser causado por trastornos potencialmente mortales case un ataque al corazón o un coágulo de rose en los pulmones.    ¿Cuáles otros síntomas podrían presentarse con el dolor en el pecho?    - Yuridia sensación de ardor detrás del esternón  - Ritmo cardíaco acelerado o lento  - Fiebre o sudoración  - Náuseas o vómitos  - Falta de aliento  - Molestia o presión que se propaga del pecho a la espalda, mandíbula o brazo  - Sensación de debilidad, cansancio o desmayo    ¿Cómo se diagnostica la causa del dolor en el pecho? Lackey médico lo examinará. Describa lackey dolor en el pecho con el bob detalle que sea posible. Dígale dónde le duele y cuándo empezó el dolor. Coméntele si usted nota algo que hace empeorar o mejorar el dolor. Además infórmele si el dolor es irineo o intermitente. Lackey médico le preguntará cuáles son los medicamentos que radha y acerca de cualquier condición médica que tenga. También lo examinará. Es posible que también deba hacerse alguno de los siguientes exámenes:     - Un electrocardiogramaes un examen que registra la actividad eléctrica de lackey corazón.  - Los análisis de sangrerevisan si hay daños en el corazón y signos de ataque cardíaco.  - Un ecocardiogramausa ondas de kim para mitra si la rose fluye normalmente a través del corazón.  - Un ultrasonido, yuridia radiografía, yuridia tomografía computarizada o yuridia imagen por resonancia magnética (IRM)podría mostrar la causa de lackey dolor de pecho. Es posible que le administren líquido de contraste para que lackey corazón se lisa mejor en las imágenes. Dígale al médico si usted alguna vez ha tenido yuridia reacción alérgica al líquido de contraste. No entre a la pamela donde se realiza la resonancia magnética con algo de metal. El metal puede causar lesiones serias. Dígale al médico si usted tiene algo de metal dentro de lackey cuerpo o por encima.  - Yuridia endoscopiapuede hacerse para revisar si tiene úlceras o problemas con el esófago.    ¿Cómo se trata el dolor en el pecho?Se le podrán administrar medicamentos para tratar la causa del dolor de pecho. Por ejemplo, analgésicos, medicamentos para la ansiedad o medicamentos para aumentar el flujo de rose al corazón. No tome ciertos medicamentos sin antes preguntarle a lackey médico. Estos incluyen BELTRAN, suplementos vitamínicos o a base de hierbas u hormonas (estrógeno o progestágeno). Mehul o más stents podrían colocarse en lackey corazón, de ser necesarios, si el dolor era causado por la obstrucción. Un stent es un pequeño tubo elaborado en daria de alambre que ayuda a mantener abierta la arteria.    ¿Cuáles son algunos consejos para vivir yuridia mazin misty?Los siguientes son consejos generales de lencho. Si lackey dolor de pecho es causado por un problema cardíaco, lackey médico le dará consejos específicos a seguir.     - No fume.La nicotina y otros químicos contenidos en los cigarrillos y cigarros pueden causar daño a nilda pulmones y el corazón. Pida información a lackey médico si usted actualmente fuma y necesita ayuda para dejar de fumar. Los cigarrillos electrónicos o el tabaco sin humo igualmente contienen nicotina. Consulte con lackey médico antes de utilizar estos productos.  - Consuma yuridia variedad de alimentos saludables y bajos en grasas y dhruv.Los alimentos saludables incluyen frutas, verduras, pan integral, productos lácteos bajos en grasa, frijoles, dakotah magras y pescado. Pida más información acerca de yuridia dieta saludable para el corazón.  - Consuma abundante agua al día.Lackey cuerpo está conformado en lackey mayoría por agua. El agua ayuda al cuerpo a controlar la temperatura y la presión arterial. Pregunte a lackey médico cuál es la cantidad de agua que debería consumir cada día.  - Pregunte acerca de la actividad.Lackey médico le dirá cuáles actividades limitar y cuáles evitar. Pregunte cuándo puede manejar, regresar a lackey trabajo y tener relaciones sexuales. Pida más información acerca de un plan de ejercicio adecuado para usted.  - Mantenga un peso saludable.Consulte con lackey médico cuánto debería pesar. [place] que lo ayude a crear un plan para bajar de peso si tiene sobrepeso.    Llame al 911 si:    - Tiene alguno de los siguientes signos de un ataque cardíaco:   - Estrujamiento, presión o tensión en lackey pecho    Usted también podría presentar alguno de los siguientes:     - Malestar o dolor en lackey espalda, hector, mandíbula, abdomen, o brazo  - Falta de aliento  - Náuseas o vómitos  - Desvanecimiento o sudor frío repentino    ¿Cuándo valentine buscar atención inmediata?    - La inflamación en lackey pecho empeora, aun con tratamiento.  - Usted tose o vomita rose.  - Nilda heces son negras o tienen rose.  - Usted no puede dejar de vomitar o le duele al tragar.    ¿Cuándo valentine comunicarme con mi médico?    - Usted tiene preguntas o inquietudes acerca de lackey condición o cuidado. You were noted to have elevated liver enzymes. Please follow up with the gastroenterologist for further evaluation.  ----  Nuestros coordinadores de referencias del departamento de emergencias se comunicarán con usted en las próximas 24 a 48 horas de 9:00 a.m. a 5:00 p.m. (de lunes a viernes) con yuridia terry de seguimiento. Espere yuridia llamada telefónica del hospital en barbi período de tiempo. Si no recibe yuridia llamada o si tiene alguna pregunta o inquietud, puede comunicarse con ellos al [phone] o [phone].  ---  Gastritis en adultos    La gastritis es la inflamación del estómago. Hay dos tipos de gastritis:  - Gastritis aguda. Christina tipo aparece de manera repentina.  - Gastritis crónica. Christina tipo es mucho más frecuente. Se desarrolla lentamente y dura un mariano tiempo.    La gastritis se manifiesta cuando el revestimiento del estómago se debilita o se lesiona. Sin tratamiento, la gastritis puede causar sangrado y úlceras estomacales.    ¿Cuáles son las causas?  Esta afección puede ser causada por lo siguiente:  - Yuridia infección.  - Beber alcohol en exceso.  - Ciertos medicamentos. Estos incluyen corticoesteroides, antibióticos y algunos medicamentos de venta sin receta, case aspirina o ibuprofeno.  - Tener demasiado ácido en el estómago.  - Tener yuridia enfermedad del estómago.  Algunas otras causas son las siguientes:  - Yuridia reacción alérgica.  - Algunos tratamientos para el cáncer (radiación).  - Fumar cigarrillos o usar productos que contengan nicotina o tabaco.  - En algunos casos, se desconoce la causa de esta afección.    ¿Qué incrementa el riesgo?  Tener yuridia enfermedad de los intestinos.  Tener yuridia enfermedad por la cual el propio sistema inmunitario ataca el organismo (enfermedad autoinmunitaria), case la enfermedad de Crohn.  Usar aspirina o ibuprofeno y otros antiinflamatorios no esteroideos (BELTRAN) para tratar otras afecciones, case enfermedades cardíacas o dolor crónico.  Estrés.    ¿Cuáles son los signos o síntomas?  Los síntomas de esta afección incluyen los siguientes:  - Dolor o sensación de ardor en la parte superior del abdomen.  - Náuseas.  - Vómitos.  - Sensación molesta de saciedad después de comer.  - Pérdida de peso.  - Mal aliento.  - Rose en el vómito o en la materia fecal (heces).  En algunos casos, no hay síntomas.    ¿Cómo se diagnostica?  Esta afección se puede diagnosticar en función de nilda antecedentes médicos, un examen físico y pruebas. Las pruebas pueden incluir las siguientes:  - Nilda antecedentes médicos y yuridia descripción de nilda síntomas.  - Un examen físico.  - Pruebas. Estas pueden incluir las siguientes:  - Pruebas de rose.  - Pruebas de heces.  Yuridia prueba en la que se introduce un instrumento zapata y flexible con uyridia hilaria y yuridia pequeña cámara a través del esófago hasta el estómago (endoscopía johnny).  Yuridia prueba en la que se radha yuridia muestra de tejido para analizarla con un microscopio (biopsia).    ¿Cómo se trata?  Esta afección se puede tratar con medicamentos. Los medicamentos que se utilizan varían según la causa de la gastritis.  Si la afección se debe a yuridia infección bacteriana, pueden recetarle medicamentos antibióticos.  Si la afección se debe al exceso de ácido estomacal, se pueden administrar medicamentos denominados bloqueadores H2, inhibidores de la bomba de protones o antiácidos.  El tratamiento también puede incluir interrumpir el uso de ciertos medicamentos case aspirina o ibuprofeno y otros antiinflamatorios no esteroideos (BELTRAN).    Siga estas instrucciones en lackey casa:    Medicamentos    Use los medicamentos de venta haleigh y los recetados solamente case se lo haya indicado el médico.  Si le recetaron un antibiótico, tómelo case se lo haya indicado el médico. No deje de claudio el antibiótico aunque comience a sentirse mejor.    Consumo de alcohol    No tamika alcohol si:  - Lackey médico le indica no hacerlo.  - Está embarazada, puede estar embarazada o está tratando de quedar embarazada.    Si andrea alcohol:  - Limite lackey uso a las siguientes medidas:  - De 0 a 1 medida por día para las mujeres.  - De 0 a 2 medidas por día para los hombres.  Sepa cuánta cantidad de alcohol hay en las bebidas que radha. [place], yuridia medida equivale a yuridia botella de cerveza de 12 oz (355 ml), un vaso de vino de 5 oz (148 ml) o un vaso de yuridia bebida alcohólica de johnny graduación de 1½ oz (44 ml).    Instrucciones generales    Luz comidas pequeñas y frecuentes merary el día en lugar de comidas abundantes.  Evite los alimentos y las bebidas que intensifican los síntomas.  Hable con el médico sobre las formas de controlar el estrés, case realizar ejercicio con regularidad o practicar respiración profunda, meditación o yoga.  No consuma ningún producto que contenga nicotina o tabaco. Estos productos incluyen cigarrillos, tabaco para mascar y aparatos de vapeo, case los cigarrillos electrónicos. Si necesita ayuda para dejar de fumar, consulte al médico.  Tamika suficiente líquido case para mantener la orina de color amarillo pálido.  Concurra a todas las visitas de seguimiento. [place] es importante.    Comuníquese con un médico si:  - Nilda síntomas empeoran.  - El dolor abdominal empeora.  - Los síntomas regresan después del tratamiento.  - Tiene fiebre.    Solicite ayuda de inmediato si:  - Vomita rose o yuridia sustancia parecida a los posos del café.  - Las heces son negras o de color garvin oscuro.  - No puede retener los líquidos.    Estos síntomas pueden representar un problema grave que constituye yuridia emergencia. No espere a mitra si los síntomas desaparecen. Solicite atención médica de inmediato. Comuníquese con el servicio de emergencias de lackey localidad (911 en [place]). No conduzca por nilda propios medios hasta el hospital.    Resumen    La gastritis es la inflamación del revestimiento del estómago que puede ocurrir de manera repentina (aguda) o desarrollarse lentamente con el tiempo (crónica).  Esta afección se diagnostica mediante los antecedentes médicos, un examen físico o pruebas.  Esta afección puede tratarse con medicamentos para tratar la infección o medicamentos para reducir la cantidad de ácido en el estómago.  Siga las instrucciones del médico acerca de claudio medicamentos, hacer cambios en la dieta y saber cuándo pedir ayuda.    Esta información no tiene case fin reemplazar el consejo del médico. Asegúrese de hacerle al médico cualquier pregunta que tenga.    Dolor de pecho    LO QUE NECESITA SABER:    ¿Qué provoca el dolor en el pecho?El dolor en el pecho puede ser provocado por yuridia variedad de condiciones, algunas no tan serias y otras que son de peligro mortal. El dolor de pecho también puede ser un síntoma de un problema digestivo, case la acidez o yuridia úlcera estomacal. Un ataque de ansiedad o yuridia emoción jez, case el enojo, también pueden provocar dolor de pecho. Yuridia infección, inflamación o fractura en un hueso o cartílago en el pecho podría provocar dolor o molestia. En ocasiones el dolor torácico o la presión en el pecho pueden ser el resultado de radha circulación de la rose al corazón (angina). El dolor de pecho también puede ser causado por trastornos potencialmente mortales case un ataque al corazón o un coágulo de rose en los pulmones.    ¿Cuáles otros síntomas podrían presentarse con el dolor en el pecho?    - Yuridia sensación de ardor detrás del esternón  - Ritmo cardíaco acelerado o lento  - Fiebre o sudoración  - Náuseas o vómitos  - Falta de aliento  - Molestia o presión que se propaga del pecho a la espalda, mandíbula o brazo  - Sensación de debilidad, cansancio o desmayo    ¿Cómo se diagnostica la causa del dolor en el pecho? Lackey médico lo examinará. Describa lackey dolor en el pecho con el bob detalle que sea posible. Dígale dónde le duele y cuándo empezó el dolor. Coméntele si usted nota algo que hace empeorar o mejorar el dolor. Además infórmele si el dolor es irineo o intermitente. Lackey médico le preguntará cuáles son los medicamentos que radha y acerca de cualquier condición médica que tenga. También lo examinará. Es posible que también deba hacerse alguno de los siguientes exámenes:     - Un electrocardiogramaes un examen que registra la actividad eléctrica de lackey corazón.  - Los análisis de sangrerevisan si hay daños en el corazón y signos de ataque cardíaco.  - Un ecocardiogramausa ondas de kim para mitra si la rose fluye normalmente a través del corazón.  - Un ultrasonido, yuridia radiografía, yuridia tomografía computarizada o yuridia imagen por resonancia magnética (IRM)podría mostrar la causa de lackey dolor de pecho. Es posible que le administren líquido de contraste para que lackey corazón se lisa mejor en las imágenes. Dígale al médico si usted alguna vez ha tenido yuridia reacción alérgica al líquido de contraste. No entre a la pamela donde se realiza la resonancia magnética con algo de metal. El metal puede causar lesiones serias. Dígale al médico si usted tiene algo de metal dentro de lackey cuerpo o por encima.  - Yuridia endoscopiapuede hacerse para revisar si tiene úlceras o problemas con el esófago.    ¿Cómo se trata el dolor en el pecho?Se le podrán administrar medicamentos para tratar la causa del dolor de pecho. Por ejemplo, analgésicos, medicamentos para la ansiedad o medicamentos para aumentar el flujo de rose al corazón. No tome ciertos medicamentos sin antes preguntarle a lackey médico. Estos incluyen BELTRAN, suplementos vitamínicos o a base de hierbas u hormonas (estrógeno o progestágeno). Mehul o más stents podrían colocarse en lackey corazón, de ser necesarios, si el dolor era causado por la obstrucción. Un stent es un pequeño tubo elaborado en daria de alambre que ayuda a mantener abierta la arteria.    ¿Cuáles son algunos consejos para vivir yuridia mazin misty?Los siguientes son consejos generales de lencho. Si lackey dolor de pecho es causado por un problema cardíaco, lackey médico le dará consejos específicos a seguir.     - No fume.La nicotina y otros químicos contenidos en los cigarrillos y cigarros pueden causar daño a nilda pulmones y el corazón. Pida información a lackey médico si usted actualmente fuma y necesita ayuda para dejar de fumar. Los cigarrillos electrónicos o el tabaco sin humo igualmente contienen nicotina. Consulte con lackey médico antes de utilizar estos productos.  - Consuma yuridia variedad de alimentos saludables y bajos en grasas y dhruv.Los alimentos saludables incluyen frutas, verduras, pan integral, productos lácteos bajos en grasa, frijoles, dakotah magras y pescado. Pida más información acerca de yuridia dieta saludable para el corazón.  - Consuma abundante agua al día.Lackey cuerpo está conformado en lackey mayoría por agua. El agua ayuda al cuerpo a controlar la temperatura y la presión arterial. Pregunte a lackey médico cuál es la cantidad de agua que debería consumir cada día.  - Pregunte acerca de la actividad.Lackey médico le dirá cuáles actividades limitar y cuáles evitar. Pregunte cuándo puede manejar, regresar a lackey trabajo y tener relaciones sexuales. Pida más información acerca de un plan de ejercicio adecuado para usted.  - Mantenga un peso saludable.Consulte con lackey médico cuánto debería pesar. [place] que lo ayude a crear un plan para bajar de peso si tiene sobrepeso.    Llame al 911 si:    - Tiene alguno de los siguientes signos de un ataque cardíaco:   - Estrujamiento, presión o tensión en lackey pecho    Usted también podría presentar alguno de los siguientes:     - Malestar o dolor en lackey espalda, hector, mandíbula, abdomen, o brazo  - Falta de aliento  - Náuseas o vómitos  - Desvanecimiento o sudor frío repentino    ¿Cuándo valentine buscar atención inmediata?    - La inflamación en lackey pecho empeora, aun con tratamiento.  - Usted tose o vomita rose.  - Nilda heces son negras o tienen rose.  - Usted no puede dejar de vomitar o le duele al tragar.    ¿Cuándo valentine comunicarme con mi médico?    - Usted tiene preguntas o inquietudes acerca de lackey condición o cuidado.

## 2024-09-02 NOTE — ED PROVIDER NOTE - PATIENT PORTAL LINK FT
You can access the FollowMyHealth Patient Portal offered by [facility] by registering at the following website: [email or web address]. By joining [facility] FollowMyHealth portal, you will also be able to view your health information using other applications (apps) compatible with our system.

## 2024-09-02 NOTE — ED PROVIDER NOTE - CLINICAL SUMMARY MEDICAL DECISION MAKING FREE TEXT BOX
Pt here with postprandial/nocturnal epigastric/cp suggestive of PUD vs gastritis. Vitals wnl in ED. Low suspicion for PE given no PE risk factors, no tachycardia, no tachypnea, no hypoxemia, PERC negative. Low suspicion for dissection given equal distal pulses, normotensive, no neuro deficits however will check for widened mediastinum on cxr. Low suspicion for esophageal perforation given no recent instrumentation or vomiting, no increased thoracic pressure, no hammans crunch. Plan for labs, ekg, imaging r/o pancreatitis, cholecystitis, choledocho, ACS, ptx, pneumomediastinum, pneumonia, pericarditis, myocarditis, pleural effusion. GI cocktail and reassess. Labs notable for elevated LFTs in AST>ALT pattern (although hemolyzed), trop <6. Pt says he does drink etoh but not daily. Cxr with ?gas bubble in ?stomach but no infiltrate. RUQ US no gallstones or e/o acute cholecystitis. Discussed elevated LFTs and need to f/u with GI outpatient to r/o hepatocellular disease. Given GI and PMD f/u. Considered observation vs admission however pt is a good candidate for d/c home with outpatient f/u given that no life threatening pathology found in ED and pt has close outpatient f/u. Strict ED return precautions given. Pt verbalized understanding and was agreeable with plan.

## 2024-09-02 NOTE — ED PROVIDER NOTE - CARE PLAN
Principal Discharge DX:	Chest pain  Secondary Diagnosis:	Epigastric pain   1 Principal Discharge DX:	Chest pain  Secondary Diagnosis:	Epigastric pain  Secondary Diagnosis:	Elevated liver enzymes

## 2024-09-02 NOTE — ED ADULT NURSE NOTE - OBJECTIVE STATEMENT
Patient presents to ED with complaints of 5 days of burning epigastric pain radiating to the chest and endorses R sided flank pain. Patient denies SOB, n/v/d, fever, dysuria. Patient reports pain worsened today prompting visit. Denies PMH.

## 2024-09-02 NOTE — ED PROVIDER NOTE - NSFOLLOWUPCLINICS_GEN_ALL_ED_FT
[facility]  Medicine  [address], [place]   Phone: [phone]  Fax:   Follow Up Time: 7-10 Days

## 2025-01-03 NOTE — ED ADULT TRIAGE NOTE - NSWEIGHTCALCTOOLDRUG_GEN_A_CORE
AFTER YOUR PROCEDURE    Date of Procedure: 1/3/2025    You had the following procedure(s) performed today:  Colonoscopy    Exam Results:  The Physician removed 2 polyp(s), which will be sent to the lab for testing. Lab results will be called or mailed to you within 7-10 business days. Please follow up as directed in your final lab report .    Diet Instructions:  You may resume your regular diet today. It is recommended to avoid heavy, greasy, and spicy foods today.    Medications:  You may resume your medications as prescribed.      It is normal to have.....    Colonoscopy / Sigmoidoscopy   Mild abdominal distention and/or cramping are normal after these procedures, but should pass within an hour or two with the passage of air.  Mild nausea and/or vomiting       When to call Dr. Ramos at [phone] :    Colonoscopy / Sigmoidoscopy   If you have unusual belly pain that is NOT relieved with passing air  If you have rectal bleeding more than blood streaking on the toilet tissue  If you have moderate nausea and/or vomiting         Go to the Emergency Room if you experience any of the following:  Severe pain  Difficulty breathing or swallowing  Persistent vomiting  Vomiting blood, either brown (coffee ground in consistency) or red  Significant rectal bleeding  Chills or fever over 101 degrees F.     Additional Instructions:  Any further questions after hours please contact [facility] 24 hours nurse call center at [phone].        Want to Say “Thank You” to a Nurse?  The [employer]AND[employer] Award® was created in memory of JARAD Hogan by his family to say thank you to bedside nurses who provide an outstanding level of care.  Submit a nomination using any method below.     OR    [email or web address]      
 used